# Patient Record
Sex: MALE | Race: WHITE | NOT HISPANIC OR LATINO | URBAN - METROPOLITAN AREA
[De-identification: names, ages, dates, MRNs, and addresses within clinical notes are randomized per-mention and may not be internally consistent; named-entity substitution may affect disease eponyms.]

---

## 2017-03-14 ENCOUNTER — APPOINTMENT (RX ONLY)
Dept: URBAN - METROPOLITAN AREA CLINIC 349 | Facility: CLINIC | Age: 57
Setting detail: DERMATOLOGY
End: 2017-03-14

## 2017-03-14 DIAGNOSIS — D18.0 HEMANGIOMA: ICD-10-CM

## 2017-03-14 DIAGNOSIS — Z85.828 PERSONAL HISTORY OF OTHER MALIGNANT NEOPLASM OF SKIN: ICD-10-CM

## 2017-03-14 DIAGNOSIS — L57.0 ACTINIC KERATOSIS: ICD-10-CM

## 2017-03-14 DIAGNOSIS — L57.8 OTHER SKIN CHANGES DUE TO CHRONIC EXPOSURE TO NONIONIZING RADIATION: ICD-10-CM

## 2017-03-14 DIAGNOSIS — D22 MELANOCYTIC NEVI: ICD-10-CM

## 2017-03-14 DIAGNOSIS — L71.8 OTHER ROSACEA: ICD-10-CM

## 2017-03-14 PROBLEM — D18.01 HEMANGIOMA OF SKIN AND SUBCUTANEOUS TISSUE: Status: ACTIVE | Noted: 2017-03-14

## 2017-03-14 PROBLEM — D22.62 MELANOCYTIC NEVI OF LEFT UPPER LIMB, INCLUDING SHOULDER: Status: ACTIVE | Noted: 2017-03-14

## 2017-03-14 PROCEDURE — 99214 OFFICE O/P EST MOD 30 MIN: CPT | Mod: 25

## 2017-03-14 PROCEDURE — ? COUNSELING

## 2017-03-14 PROCEDURE — ? LIQUID NITROGEN

## 2017-03-14 PROCEDURE — 17000 DESTRUCT PREMALG LESION: CPT

## 2017-03-14 PROCEDURE — ? PRESCRIPTION

## 2017-03-14 PROCEDURE — 17003 DESTRUCT PREMALG LES 2-14: CPT

## 2017-03-14 RX ORDER — AZELAIC ACID 0.15 G/G
GEL TOPICAL QD
Qty: 2 | Refills: 3 | Status: ERX

## 2017-03-14 ASSESSMENT — LOCATION DETAILED DESCRIPTION DERM
LOCATION DETAILED: LEFT SUPERIOR MEDIAL LOWER BACK
LOCATION DETAILED: RIGHT CENTRAL MALAR CHEEK
LOCATION DETAILED: STERNAL NOTCH
LOCATION DETAILED: LEFT ANTERIOR PROXIMAL UPPER ARM
LOCATION DETAILED: LEFT PROXIMAL DORSAL FOREARM
LOCATION DETAILED: RIGHT CHIN
LOCATION DETAILED: RIGHT INFERIOR MEDIAL FOREHEAD
LOCATION DETAILED: LEFT ANTERIOR DISTAL UPPER ARM
LOCATION DETAILED: LEFT CENTRAL MALAR CHEEK
LOCATION DETAILED: LEFT ULNAR DORSAL HAND
LOCATION DETAILED: RIGHT INFERIOR CENTRAL MALAR CHEEK
LOCATION DETAILED: LEFT SUPERIOR MEDIAL UPPER BACK
LOCATION DETAILED: RIGHT INFERIOR UPPER BACK
LOCATION DETAILED: RIGHT SUPERIOR PARIETAL SCALP
LOCATION DETAILED: RIGHT PROXIMAL DORSAL FOREARM
LOCATION DETAILED: STERNUM
LOCATION DETAILED: EPIGASTRIC SKIN
LOCATION DETAILED: RIGHT ANTERIOR DISTAL UPPER ARM
LOCATION DETAILED: NASAL DORSUM
LOCATION DETAILED: RIGHT LOWER CUTANEOUS LIP
LOCATION DETAILED: RIGHT RADIAL DORSAL HAND
LOCATION DETAILED: LEFT LATERAL EYEBROW
LOCATION DETAILED: RIGHT SUPERIOR MEDIAL MALAR CHEEK
LOCATION DETAILED: RIGHT MEDIAL FOREHEAD
LOCATION DETAILED: RIGHT ANTECUBITAL SKIN

## 2017-03-14 ASSESSMENT — LOCATION SIMPLE DESCRIPTION DERM
LOCATION SIMPLE: CHEST
LOCATION SIMPLE: LEFT EYEBROW
LOCATION SIMPLE: LEFT HAND
LOCATION SIMPLE: LEFT CHEEK
LOCATION SIMPLE: CHIN
LOCATION SIMPLE: RIGHT ELBOW
LOCATION SIMPLE: LEFT FOREARM
LOCATION SIMPLE: RIGHT UPPER BACK
LOCATION SIMPLE: RIGHT FOREHEAD
LOCATION SIMPLE: RIGHT FOREARM
LOCATION SIMPLE: SCALP
LOCATION SIMPLE: LEFT UPPER BACK
LOCATION SIMPLE: RIGHT UPPER ARM
LOCATION SIMPLE: RIGHT HAND
LOCATION SIMPLE: NOSE
LOCATION SIMPLE: ABDOMEN
LOCATION SIMPLE: LEFT LOWER BACK
LOCATION SIMPLE: RIGHT LIP
LOCATION SIMPLE: LEFT UPPER ARM
LOCATION SIMPLE: RIGHT CHEEK

## 2017-03-14 ASSESSMENT — LOCATION ZONE DERM
LOCATION ZONE: ARM
LOCATION ZONE: LIP
LOCATION ZONE: TRUNK
LOCATION ZONE: SCALP
LOCATION ZONE: FACE
LOCATION ZONE: NOSE
LOCATION ZONE: HAND

## 2017-03-14 ASSESSMENT — PAIN INTENSITY VAS: HOW INTENSE IS YOUR PAIN 0 BEING NO PAIN, 10 BEING THE MOST SEVERE PAIN POSSIBLE?: NO PAIN

## 2017-03-14 NOTE — PROCEDURE: LIQUID NITROGEN
Number Of Freeze-Thaw Cycles: 1 freeze-thaw cycle
Detail Level: Detailed
Duration Of Freeze Thaw-Cycle (Seconds): 3
Consent: The patient's consent was obtained including but not limited to risks of crusting, scabbing, blistering, scarring, darker or lighter pigmentary change, recurrence, incomplete removal and infection.
Render Post-Care Instructions In Note?: no
Post-Care Instructions: I reviewed with the patient in detail post-care instructions. Patient is to wear sunprotection, and avoid picking at any of the treated lesions. Pt may apply Vaseline to crusted or scabbing areas.

## 2017-09-14 ENCOUNTER — APPOINTMENT (RX ONLY)
Dept: URBAN - METROPOLITAN AREA CLINIC 349 | Facility: CLINIC | Age: 57
Setting detail: DERMATOLOGY
End: 2017-09-14

## 2017-09-14 DIAGNOSIS — L81.4 OTHER MELANIN HYPERPIGMENTATION: ICD-10-CM

## 2017-09-14 DIAGNOSIS — D18.0 HEMANGIOMA: ICD-10-CM

## 2017-09-14 DIAGNOSIS — L82.0 INFLAMED SEBORRHEIC KERATOSIS: ICD-10-CM

## 2017-09-14 DIAGNOSIS — L57.0 ACTINIC KERATOSIS: ICD-10-CM

## 2017-09-14 DIAGNOSIS — L81.2 FRECKLES: ICD-10-CM

## 2017-09-14 DIAGNOSIS — L71.8 OTHER ROSACEA: ICD-10-CM

## 2017-09-14 DIAGNOSIS — Z85.828 PERSONAL HISTORY OF OTHER MALIGNANT NEOPLASM OF SKIN: ICD-10-CM

## 2017-09-14 DIAGNOSIS — L57.8 OTHER SKIN CHANGES DUE TO CHRONIC EXPOSURE TO NONIONIZING RADIATION: ICD-10-CM

## 2017-09-14 DIAGNOSIS — D22 MELANOCYTIC NEVI: ICD-10-CM

## 2017-09-14 PROBLEM — K21.9 GASTRO-ESOPHAGEAL REFLUX DISEASE WITHOUT ESOPHAGITIS: Status: ACTIVE | Noted: 2017-09-14

## 2017-09-14 PROBLEM — D22.72 MELANOCYTIC NEVI OF LEFT LOWER LIMB, INCLUDING HIP: Status: ACTIVE | Noted: 2017-09-14

## 2017-09-14 PROBLEM — D22.71 MELANOCYTIC NEVI OF RIGHT LOWER LIMB, INCLUDING HIP: Status: ACTIVE | Noted: 2017-09-14

## 2017-09-14 PROBLEM — D22.61 MELANOCYTIC NEVI OF RIGHT UPPER LIMB, INCLUDING SHOULDER: Status: ACTIVE | Noted: 2017-09-14

## 2017-09-14 PROBLEM — L20.84 INTRINSIC (ALLERGIC) ECZEMA: Status: ACTIVE | Noted: 2017-09-14

## 2017-09-14 PROBLEM — D22.5 MELANOCYTIC NEVI OF TRUNK: Status: ACTIVE | Noted: 2017-09-14

## 2017-09-14 PROBLEM — D18.01 HEMANGIOMA OF SKIN AND SUBCUTANEOUS TISSUE: Status: ACTIVE | Noted: 2017-09-14

## 2017-09-14 PROCEDURE — ? TREATMENT REGIMEN

## 2017-09-14 PROCEDURE — ? COUNSELING

## 2017-09-14 PROCEDURE — ? OTHER

## 2017-09-14 PROCEDURE — 17000 DESTRUCT PREMALG LESION: CPT | Mod: 59

## 2017-09-14 PROCEDURE — ? LIQUID NITROGEN

## 2017-09-14 PROCEDURE — 99214 OFFICE O/P EST MOD 30 MIN: CPT | Mod: 25

## 2017-09-14 PROCEDURE — 17110 DESTRUCTION B9 LES UP TO 14: CPT

## 2017-09-14 ASSESSMENT — LOCATION SIMPLE DESCRIPTION DERM
LOCATION SIMPLE: RIGHT HAND
LOCATION SIMPLE: LEFT SCALP
LOCATION SIMPLE: RIGHT UPPER ARM
LOCATION SIMPLE: LEFT HAND
LOCATION SIMPLE: POSTERIOR SCALP
LOCATION SIMPLE: LEFT CHEEK
LOCATION SIMPLE: RIGHT SHOULDER
LOCATION SIMPLE: LEFT SHOULDER
LOCATION SIMPLE: CHEST
LOCATION SIMPLE: NOSE
LOCATION SIMPLE: RIGHT UPPER BACK
LOCATION SIMPLE: LEFT POSTERIOR THIGH
LOCATION SIMPLE: LEFT UPPER BACK
LOCATION SIMPLE: RIGHT PRETIBIAL REGION
LOCATION SIMPLE: LEFT LOWER BACK
LOCATION SIMPLE: RIGHT FOREARM
LOCATION SIMPLE: LEFT FOREARM
LOCATION SIMPLE: RIGHT CHEEK
LOCATION SIMPLE: RIGHT FOREHEAD
LOCATION SIMPLE: ABDOMEN

## 2017-09-14 ASSESSMENT — LOCATION ZONE DERM
LOCATION ZONE: FACE
LOCATION ZONE: HAND
LOCATION ZONE: TRUNK
LOCATION ZONE: NOSE
LOCATION ZONE: LEG
LOCATION ZONE: SCALP
LOCATION ZONE: ARM

## 2017-09-14 ASSESSMENT — LOCATION DETAILED DESCRIPTION DERM
LOCATION DETAILED: RIGHT LATERAL SUPERIOR CHEST
LOCATION DETAILED: LEFT RIB CAGE
LOCATION DETAILED: LEFT SUPERIOR MEDIAL MIDBACK
LOCATION DETAILED: RIGHT INFERIOR MEDIAL UPPER BACK
LOCATION DETAILED: RIGHT SUPERIOR MEDIAL UPPER BACK
LOCATION DETAILED: POSTERIOR MID-PARIETAL SCALP
LOCATION DETAILED: LEFT PROXIMAL DORSAL FOREARM
LOCATION DETAILED: RIGHT DISTAL DORSAL FOREARM
LOCATION DETAILED: RIGHT SUPERIOR FOREHEAD
LOCATION DETAILED: RIGHT ANTERIOR PROXIMAL UPPER ARM
LOCATION DETAILED: LEFT MEDIAL SUPERIOR CHEST
LOCATION DETAILED: LEFT POSTERIOR SHOULDER
LOCATION DETAILED: RIGHT CENTRAL MALAR CHEEK
LOCATION DETAILED: RIGHT POSTERIOR SHOULDER
LOCATION DETAILED: RIGHT PROXIMAL PRETIBIAL REGION
LOCATION DETAILED: EPIGASTRIC SKIN
LOCATION DETAILED: LEFT CENTRAL FRONTAL SCALP
LOCATION DETAILED: LEFT MID-UPPER BACK
LOCATION DETAILED: RIGHT SUPERIOR UPPER BACK
LOCATION DETAILED: RIGHT RADIAL DORSAL HAND
LOCATION DETAILED: PERIUMBILICAL SKIN
LOCATION DETAILED: RIGHT PROXIMAL DORSAL FOREARM
LOCATION DETAILED: LEFT DISTAL DORSAL FOREARM
LOCATION DETAILED: NASAL DORSUM
LOCATION DETAILED: LEFT DORSAL RING METACARPOPHALANGEAL JOINT
LOCATION DETAILED: LEFT CENTRAL MALAR CHEEK
LOCATION DETAILED: LEFT DISTAL POSTERIOR THIGH

## 2017-09-14 ASSESSMENT — PAIN INTENSITY VAS: HOW INTENSE IS YOUR PAIN 0 BEING NO PAIN, 10 BEING THE MOST SEVERE PAIN POSSIBLE?: NO PAIN

## 2017-09-14 NOTE — PROCEDURE: OTHER
Note Text (......Xxx Chief Complaint.): Photographs taken with pt permission
Other (Free Text): Pt will call if he likes the Soolantra and we will send a prescription
Detail Level: Detailed

## 2017-09-14 NOTE — PROCEDURE: LIQUID NITROGEN
Medical Necessity Clause: This procedure was medically necessary because the lesions that were treated were: irritated by clothing
Consent: The patient's consent was obtained including but not limited to risks of crusting, scabbing, blistering, scarring, darker or lighter pigmentary change, recurrence, incomplete removal and infection.
Duration Of Freeze Thaw-Cycle (Seconds): 2
Number Of Freeze-Thaw Cycles: 2 freeze-thaw cycles
Post-Care Instructions: I reviewed with the patient in detail post-care instructions. Patient is to wear sunprotection, and avoid picking at any of the treated lesions. Pt may apply Vaseline to crusted or scabbing areas.
Detail Level: Detailed
Render Post-Care Instructions In Note?: no
Include Z78.9 (Other Specified Conditions Influencing Health Status) As An Associated Diagnosis?: Yes
Medical Necessity Information: It is in your best interest to select a reason for this procedure from the list below. All of these items fulfill various CMS LCD requirements except the new and changing color options.
Number Of Freeze-Thaw Cycles: 1 freeze-thaw cycle

## 2017-09-14 NOTE — PROCEDURE: TREATMENT REGIMEN
Initiate Treatment: Soolantra to use pea size on face at night
Samples Given: Soolantra
Detail Level: Zone

## 2018-01-23 ENCOUNTER — APPOINTMENT (RX ONLY)
Dept: URBAN - METROPOLITAN AREA CLINIC 349 | Facility: CLINIC | Age: 58
Setting detail: DERMATOLOGY
End: 2018-01-23

## 2018-01-23 DIAGNOSIS — L57.0 ACTINIC KERATOSIS: ICD-10-CM

## 2018-01-23 DIAGNOSIS — Z85.828 PERSONAL HISTORY OF OTHER MALIGNANT NEOPLASM OF SKIN: ICD-10-CM

## 2018-01-23 PROCEDURE — ? COUNSELING

## 2018-01-23 PROCEDURE — ? LIQUID NITROGEN

## 2018-01-23 PROCEDURE — 17003 DESTRUCT PREMALG LES 2-14: CPT

## 2018-01-23 PROCEDURE — 17000 DESTRUCT PREMALG LESION: CPT

## 2018-01-23 PROCEDURE — 99213 OFFICE O/P EST LOW 20 MIN: CPT | Mod: 25

## 2018-01-23 ASSESSMENT — LOCATION SIMPLE DESCRIPTION DERM
LOCATION SIMPLE: SCALP
LOCATION SIMPLE: RIGHT EAR

## 2018-01-23 ASSESSMENT — LOCATION DETAILED DESCRIPTION DERM
LOCATION DETAILED: RIGHT CENTRAL PARIETAL SCALP
LOCATION DETAILED: RIGHT ANTERIOR EARLOBE
LOCATION DETAILED: LEFT SUPERIOR PARIETAL SCALP
LOCATION DETAILED: LEFT CENTRAL PARIETAL SCALP

## 2018-01-23 ASSESSMENT — LOCATION ZONE DERM
LOCATION ZONE: EAR
LOCATION ZONE: SCALP

## 2018-01-23 NOTE — HPI: SKIN LESIONS
Have Your Skin Lesions Been Treated?: not been treated
Is This A New Presentation, Or A Follow-Up?: Skin Lesions
Which Family Member (Optional)?: Brother

## 2018-06-26 ENCOUNTER — APPOINTMENT (RX ONLY)
Dept: URBAN - METROPOLITAN AREA CLINIC 349 | Facility: CLINIC | Age: 58
Setting detail: DERMATOLOGY
End: 2018-06-26

## 2018-06-26 DIAGNOSIS — L82.0 INFLAMED SEBORRHEIC KERATOSIS: ICD-10-CM

## 2018-06-26 DIAGNOSIS — L81.4 OTHER MELANIN HYPERPIGMENTATION: ICD-10-CM

## 2018-06-26 DIAGNOSIS — L82.1 OTHER SEBORRHEIC KERATOSIS: ICD-10-CM

## 2018-06-26 DIAGNOSIS — L57.0 ACTINIC KERATOSIS: ICD-10-CM

## 2018-06-26 DIAGNOSIS — Z85.828 PERSONAL HISTORY OF OTHER MALIGNANT NEOPLASM OF SKIN: ICD-10-CM

## 2018-06-26 DIAGNOSIS — D18.0 HEMANGIOMA: ICD-10-CM

## 2018-06-26 DIAGNOSIS — D22 MELANOCYTIC NEVI: ICD-10-CM

## 2018-06-26 PROBLEM — D22.5 MELANOCYTIC NEVI OF TRUNK: Status: ACTIVE | Noted: 2018-06-26

## 2018-06-26 PROBLEM — D18.01 HEMANGIOMA OF SKIN AND SUBCUTANEOUS TISSUE: Status: ACTIVE | Noted: 2018-06-26

## 2018-06-26 PROBLEM — D22.61 MELANOCYTIC NEVI OF RIGHT UPPER LIMB, INCLUDING SHOULDER: Status: ACTIVE | Noted: 2018-06-26

## 2018-06-26 PROCEDURE — ? LIQUID NITROGEN

## 2018-06-26 PROCEDURE — 99213 OFFICE O/P EST LOW 20 MIN: CPT | Mod: 25

## 2018-06-26 PROCEDURE — 17110 DESTRUCTION B9 LES UP TO 14: CPT

## 2018-06-26 PROCEDURE — ? COUNSELING

## 2018-06-26 ASSESSMENT — LOCATION SIMPLE DESCRIPTION DERM
LOCATION SIMPLE: LEFT UPPER ARM
LOCATION SIMPLE: LEFT LOWER BACK
LOCATION SIMPLE: LEFT PRETIBIAL REGION
LOCATION SIMPLE: RIGHT SHOULDER
LOCATION SIMPLE: ABDOMEN
LOCATION SIMPLE: LEFT UPPER BACK
LOCATION SIMPLE: LEFT FOREHEAD
LOCATION SIMPLE: RIGHT PRETIBIAL REGION
LOCATION SIMPLE: RIGHT LOWER BACK
LOCATION SIMPLE: SCALP
LOCATION SIMPLE: CHEST
LOCATION SIMPLE: RIGHT FOREARM

## 2018-06-26 ASSESSMENT — LOCATION DETAILED DESCRIPTION DERM
LOCATION DETAILED: RIGHT VENTRAL DISTAL FOREARM
LOCATION DETAILED: RIGHT CENTRAL PARIETAL SCALP
LOCATION DETAILED: LEFT MEDIAL INFERIOR CHEST
LOCATION DETAILED: RIGHT ANTERIOR SHOULDER
LOCATION DETAILED: RIGHT PROXIMAL PRETIBIAL REGION
LOCATION DETAILED: PERIUMBILICAL SKIN
LOCATION DETAILED: LEFT LATERAL FOREHEAD
LOCATION DETAILED: LEFT SUPERIOR LATERAL UPPER BACK
LOCATION DETAILED: RIGHT INFERIOR MEDIAL MIDBACK
LOCATION DETAILED: LEFT PROXIMAL PRETIBIAL REGION
LOCATION DETAILED: LEFT INFERIOR MEDIAL MIDBACK
LOCATION DETAILED: LEFT DISTAL PRETIBIAL REGION
LOCATION DETAILED: LEFT SUPERIOR PARIETAL SCALP
LOCATION DETAILED: RIGHT POSTERIOR SHOULDER
LOCATION DETAILED: LEFT ANTERIOR DISTAL UPPER ARM

## 2018-06-26 ASSESSMENT — LOCATION ZONE DERM
LOCATION ZONE: SCALP
LOCATION ZONE: TRUNK
LOCATION ZONE: LEG
LOCATION ZONE: FACE
LOCATION ZONE: ARM

## 2018-06-26 NOTE — PROCEDURE: LIQUID NITROGEN
Post-Care Instructions: I reviewed with the patient in detail post-care instructions. Patient is to wear sunprotection, and avoid picking at any of the treated lesions. Pt may apply Vaseline to crusted or scabbing areas.
Add 52 Modifier (Optional): no
Medical Necessity Information: It is in your best interest to select a reason for this procedure from the list below. All of these items fulfill various CMS LCD requirements except the new and changing color options.
Include Z78.9 (Other Specified Conditions Influencing Health Status) As An Associated Diagnosis?: Yes
Detail Level: Detailed
Medical Necessity Clause: This procedure was medically necessary because the lesions that were treated were:
Consent: The patient's consent was obtained including but not limited to risks of crusting, scabbing, blistering, scarring, darker or lighter pigmentary change, recurrence, incomplete removal and infection.

## 2018-12-07 NOTE — HPI: SKIN LESIONS
How Severe Is Your Skin Lesion?: mild
Have Your Skin Lesions Been Treated?: not been treated
Is This A New Presentation, Or A Follow-Up?: Skin Lesions
normal...

## 2019-04-11 ENCOUNTER — APPOINTMENT (RX ONLY)
Dept: URBAN - METROPOLITAN AREA CLINIC 349 | Facility: CLINIC | Age: 59
Setting detail: DERMATOLOGY
End: 2019-04-11

## 2019-04-11 DIAGNOSIS — L90.8 OTHER ATROPHIC DISORDERS OF SKIN: ICD-10-CM

## 2019-04-11 DIAGNOSIS — L57.0 ACTINIC KERATOSIS: ICD-10-CM

## 2019-04-11 DIAGNOSIS — L82.0 INFLAMED SEBORRHEIC KERATOSIS: ICD-10-CM

## 2019-04-11 DIAGNOSIS — D18.0 HEMANGIOMA: ICD-10-CM

## 2019-04-11 PROBLEM — D18.01 HEMANGIOMA OF SKIN AND SUBCUTANEOUS TISSUE: Status: ACTIVE | Noted: 2019-04-11

## 2019-04-11 PROBLEM — L20.84 INTRINSIC (ALLERGIC) ECZEMA: Status: ACTIVE | Noted: 2019-04-11

## 2019-04-11 PROCEDURE — ? RECOMMENDATIONS

## 2019-04-11 PROCEDURE — ? LIQUID NITROGEN

## 2019-04-11 PROCEDURE — 99213 OFFICE O/P EST LOW 20 MIN: CPT | Mod: 25

## 2019-04-11 PROCEDURE — ? COUNSELING

## 2019-04-11 PROCEDURE — 17110 DESTRUCTION B9 LES UP TO 14: CPT

## 2019-04-11 PROCEDURE — ? PRESCRIPTION

## 2019-04-11 PROCEDURE — 17000 DESTRUCT PREMALG LESION: CPT | Mod: 59

## 2019-04-11 PROCEDURE — 17003 DESTRUCT PREMALG LES 2-14: CPT | Mod: 59

## 2019-04-11 RX ORDER — TRETINOIN 0.5 MG/G
CREAM TOPICAL
Qty: 1 | Refills: 6 | Status: ERX | COMMUNITY
Start: 2019-04-11

## 2019-04-11 RX ADMIN — TRETINOIN: 0.5 CREAM TOPICAL at 17:33

## 2019-04-11 ASSESSMENT — LOCATION DETAILED DESCRIPTION DERM
LOCATION DETAILED: RIGHT SUPERIOR MEDIAL FOREHEAD
LOCATION DETAILED: LEFT INFERIOR LATERAL MIDBACK
LOCATION DETAILED: LEFT PROXIMAL POSTERIOR UPPER ARM
LOCATION DETAILED: INFERIOR THORACIC SPINE
LOCATION DETAILED: LEFT SUPERIOR OCCIPITAL SCALP
LOCATION DETAILED: RIGHT LOWER CUTANEOUS LIP
LOCATION DETAILED: LEFT MEDIAL INFERIOR CHEST
LOCATION DETAILED: LEFT MEDIAL FOREHEAD
LOCATION DETAILED: RIGHT CENTRAL MALAR CHEEK
LOCATION DETAILED: LEFT CENTRAL MALAR CHEEK
LOCATION DETAILED: LEFT SUPERIOR FOREHEAD
LOCATION DETAILED: MID-FRONTAL SCALP

## 2019-04-11 ASSESSMENT — LOCATION SIMPLE DESCRIPTION DERM
LOCATION SIMPLE: RIGHT LIP
LOCATION SIMPLE: LEFT LOWER BACK
LOCATION SIMPLE: LEFT CHEEK
LOCATION SIMPLE: LEFT OCCIPITAL SCALP
LOCATION SIMPLE: RIGHT FOREHEAD
LOCATION SIMPLE: ANTERIOR SCALP
LOCATION SIMPLE: CHEST
LOCATION SIMPLE: UPPER BACK
LOCATION SIMPLE: LEFT UPPER ARM
LOCATION SIMPLE: LEFT FOREHEAD
LOCATION SIMPLE: RIGHT CHEEK

## 2019-04-11 ASSESSMENT — LOCATION ZONE DERM
LOCATION ZONE: FACE
LOCATION ZONE: ARM
LOCATION ZONE: TRUNK
LOCATION ZONE: SCALP
LOCATION ZONE: LIP

## 2019-04-11 ASSESSMENT — PAIN INTENSITY VAS: HOW INTENSE IS YOUR PAIN 0 BEING NO PAIN, 10 BEING THE MOST SEVERE PAIN POSSIBLE?: NO PAIN

## 2019-04-11 NOTE — PROCEDURE: LIQUID NITROGEN
Consent: The patient's consent was obtained including but not limited to risks of crusting, scabbing, blistering, scarring, darker or lighter pigmentary change, recurrence, incomplete removal and infection.
Medical Necessity Clause: This procedure was medically necessary because the lesions that were treated were:
Post-Care Instructions: I reviewed with the patient in detail post-care instructions. Patient is to wear sunprotection, and avoid picking at any of the treated lesions. Pt may apply Vaseline to crusted or scabbing areas.
Number Of Freeze-Thaw Cycles: 1 freeze-thaw cycle
Render Post-Care Instructions In Note?: no
Detail Level: Detailed
Duration Of Freeze Thaw-Cycle (Seconds): 2
Detail Level: Zone
Duration Of Freeze Thaw-Cycle (Seconds): 3
Include Z78.9 (Other Specified Conditions Influencing Health Status) As An Associated Diagnosis?: Yes
Medical Necessity Information: It is in your best interest to select a reason for this procedure from the list below. All of these items fulfill various CMS LCD requirements except the new and changing color options.

## 2019-07-22 PROBLEM — R93.1 ELEVATED CORONARY ARTERY CALCIUM SCORE: Status: ACTIVE | Noted: 2019-07-22

## 2019-09-13 NOTE — HPI: SKIN LESIONS
How Severe Is Your Skin Lesion?: moderate
Have Your Skin Lesions Been Treated?: not been treated
Is This A New Presentation, Or A Follow-Up?: Skin Lesions
I personally performed the service described in the documentation recorded by the scribe in my presence, and it accurately and completely records my words and actions.

## 2019-10-31 ENCOUNTER — APPOINTMENT (RX ONLY)
Dept: URBAN - METROPOLITAN AREA CLINIC 349 | Facility: CLINIC | Age: 59
Setting detail: DERMATOLOGY
End: 2019-10-31

## 2019-10-31 DIAGNOSIS — D22 MELANOCYTIC NEVI: ICD-10-CM

## 2019-10-31 DIAGNOSIS — L57.0 ACTINIC KERATOSIS: ICD-10-CM

## 2019-10-31 DIAGNOSIS — Z85.828 PERSONAL HISTORY OF OTHER MALIGNANT NEOPLASM OF SKIN: ICD-10-CM

## 2019-10-31 PROBLEM — D22.9 MELANOCYTIC NEVI, UNSPECIFIED: Status: ACTIVE | Noted: 2019-10-31

## 2019-10-31 PROCEDURE — 17000 DESTRUCT PREMALG LESION: CPT

## 2019-10-31 PROCEDURE — ? LIQUID NITROGEN

## 2019-10-31 PROCEDURE — ? COUNSELING

## 2019-10-31 PROCEDURE — 17003 DESTRUCT PREMALG LES 2-14: CPT

## 2019-10-31 PROCEDURE — 99213 OFFICE O/P EST LOW 20 MIN: CPT | Mod: 25

## 2019-10-31 ASSESSMENT — LOCATION DETAILED DESCRIPTION DERM
LOCATION DETAILED: RIGHT ANTERIOR EARLOBE
LOCATION DETAILED: LEFT CENTRAL ZYGOMA
LOCATION DETAILED: LEFT CENTRAL MALAR CHEEK
LOCATION DETAILED: LEFT INFERIOR LATERAL MALAR CHEEK
LOCATION DETAILED: LEFT SUPERIOR PARIETAL SCALP
LOCATION DETAILED: SUPERIOR MID FOREHEAD

## 2019-10-31 ASSESSMENT — LOCATION SIMPLE DESCRIPTION DERM
LOCATION SIMPLE: LEFT ZYGOMA
LOCATION SIMPLE: LEFT CHEEK
LOCATION SIMPLE: RIGHT EAR
LOCATION SIMPLE: SCALP
LOCATION SIMPLE: SUPERIOR FOREHEAD

## 2019-10-31 ASSESSMENT — LOCATION ZONE DERM
LOCATION ZONE: EAR
LOCATION ZONE: SCALP
LOCATION ZONE: FACE

## 2020-06-11 ENCOUNTER — APPOINTMENT (RX ONLY)
Dept: URBAN - METROPOLITAN AREA CLINIC 349 | Facility: CLINIC | Age: 60
Setting detail: DERMATOLOGY
End: 2020-06-11

## 2020-06-11 DIAGNOSIS — L82.1 OTHER SEBORRHEIC KERATOSIS: ICD-10-CM

## 2020-06-11 DIAGNOSIS — L72.0 EPIDERMAL CYST: ICD-10-CM

## 2020-06-11 DIAGNOSIS — L57.0 ACTINIC KERATOSIS: ICD-10-CM

## 2020-06-11 DIAGNOSIS — Z71.89 OTHER SPECIFIED COUNSELING: ICD-10-CM

## 2020-06-11 DIAGNOSIS — L73.8 OTHER SPECIFIED FOLLICULAR DISORDERS: ICD-10-CM

## 2020-06-11 DIAGNOSIS — Z85.828 PERSONAL HISTORY OF OTHER MALIGNANT NEOPLASM OF SKIN: ICD-10-CM

## 2020-06-11 PROCEDURE — ? BENIGN DESTRUCTION

## 2020-06-11 PROCEDURE — ? COUNSELING

## 2020-06-11 PROCEDURE — ? EDUCATIONAL RESOURCES PROVIDED

## 2020-06-11 PROCEDURE — ? ACNE SURGERY

## 2020-06-11 PROCEDURE — ? LIQUID NITROGEN

## 2020-06-11 ASSESSMENT — LOCATION ZONE DERM
LOCATION ZONE: NOSE
LOCATION ZONE: TRUNK
LOCATION ZONE: SCALP
LOCATION ZONE: FACE

## 2020-06-11 ASSESSMENT — LOCATION SIMPLE DESCRIPTION DERM
LOCATION SIMPLE: UPPER BACK
LOCATION SIMPLE: LEFT FOREHEAD
LOCATION SIMPLE: RIGHT TEMPLE
LOCATION SIMPLE: RIGHT CHEEK
LOCATION SIMPLE: LEFT NOSE
LOCATION SIMPLE: RIGHT ZYGOMA
LOCATION SIMPLE: LEFT CHEEK
LOCATION SIMPLE: LEFT SCALP
LOCATION SIMPLE: RIGHT UPPER BACK
LOCATION SIMPLE: RIGHT FOREHEAD
LOCATION SIMPLE: ANTERIOR SCALP
LOCATION SIMPLE: SCALP

## 2020-06-11 ASSESSMENT — LOCATION DETAILED DESCRIPTION DERM
LOCATION DETAILED: LEFT NASAL ALA
LOCATION DETAILED: RIGHT SUPERIOR MEDIAL FOREHEAD
LOCATION DETAILED: LEFT CENTRAL FRONTAL SCALP
LOCATION DETAILED: RIGHT INFERIOR LATERAL FOREHEAD
LOCATION DETAILED: LEFT CENTRAL MALAR CHEEK
LOCATION DETAILED: LEFT SUPERIOR FOREHEAD
LOCATION DETAILED: INFERIOR THORACIC SPINE
LOCATION DETAILED: RIGHT SUPERIOR MEDIAL UPPER BACK
LOCATION DETAILED: RIGHT LATERAL FOREHEAD
LOCATION DETAILED: RIGHT SUPERIOR FOREHEAD
LOCATION DETAILED: MID-FRONTAL SCALP
LOCATION DETAILED: RIGHT MEDIAL TEMPLE
LOCATION DETAILED: RIGHT SUPERIOR MEDIAL MALAR CHEEK
LOCATION DETAILED: RIGHT CENTRAL TEMPLE
LOCATION DETAILED: RIGHT CENTRAL MALAR CHEEK
LOCATION DETAILED: LEFT CENTRAL PARIETAL SCALP
LOCATION DETAILED: LEFT MEDIAL FOREHEAD
LOCATION DETAILED: RIGHT CENTRAL ZYGOMA
LOCATION DETAILED: RIGHT INFERIOR FOREHEAD
LOCATION DETAILED: LEFT INFERIOR FOREHEAD

## 2020-06-11 NOTE — PROCEDURE: ACNE SURGERY
Extraction Method: electrocautery
Render Number Of Lesions Treated: yes
Render Post-Care Instructions In Note?: no
Acne Type: Cysts
Prep Text (Optional): Prior to removal the treatment areas were prepped in the usual fashion.
Consent was obtained and risks were reviewed including but not limited to scarring, infection, bleeding, scabbing, incomplete removal, and allergy to anesthesia.
Detail Level: Detailed
Post-Care Instructions: I reviewed with the patient in detail post-care instructions. Patient is to keep the treatment areaas dry overnight, and then apply bacitracin twice daily until healed. Patient may apply hydrogen peroxide soaks to remove any crusting.

## 2020-06-11 NOTE — PROCEDURE: BENIGN DESTRUCTION

## 2020-06-11 NOTE — PROCEDURE: LIQUID NITROGEN
Detail Level: Detailed
Duration Of Freeze Thaw-Cycle (Seconds): 3
Render Note In Bullet Format When Appropriate: No
Post-Care Instructions: I reviewed with the patient in detail post-care instructions. Patient is to wear sunprotection, and avoid picking at any of the treated lesions. Pt may apply Vaseline to crusted or scabbing areas.
Number Of Freeze-Thaw Cycles: 2 freeze-thaw cycles
Consent: The patient's consent was obtained including but not limited to risks of crusting, scabbing, blistering, scarring, darker or lighter pigmentary change, recurrence, incomplete removal and infection.

## 2020-07-17 ENCOUNTER — APPOINTMENT (RX ONLY)
Dept: URBAN - METROPOLITAN AREA CLINIC 349 | Facility: CLINIC | Age: 60
Setting detail: DERMATOLOGY
End: 2020-07-17

## 2020-07-17 ENCOUNTER — RX ONLY (OUTPATIENT)
Age: 60
Setting detail: RX ONLY
End: 2020-07-17

## 2020-07-17 DIAGNOSIS — D22 MELANOCYTIC NEVI: ICD-10-CM

## 2020-07-17 PROBLEM — D48.5 NEOPLASM OF UNCERTAIN BEHAVIOR OF SKIN: Status: ACTIVE | Noted: 2020-07-17

## 2020-07-17 PROBLEM — D22.4 MELANOCYTIC NEVI OF SCALP AND NECK: Status: ACTIVE | Noted: 2020-07-17

## 2020-07-17 PROBLEM — D04.39 CARCINOMA IN SITU OF SKIN OF OTHER PARTS OF FACE: Status: ACTIVE | Noted: 2020-07-17

## 2020-07-17 PROCEDURE — ? SHAVE REMOVAL AND DESTRUCTION

## 2020-07-17 PROCEDURE — 17281 DSTR MAL LS F/E/E/N/L/M .6-1: CPT

## 2020-07-17 PROCEDURE — 88305 TISSUE EXAM BY PATHOLOGIST: CPT

## 2020-07-17 PROCEDURE — ? PATHOLOGY BILLING

## 2020-07-17 PROCEDURE — A4550 SURGICAL TRAYS: HCPCS

## 2020-07-17 PROCEDURE — 99213 OFFICE O/P EST LOW 20 MIN: CPT | Mod: 25

## 2020-07-17 PROCEDURE — ? COUNSELING

## 2020-07-17 RX ORDER — AZELAIC ACID 0.15 G/G
GEL TOPICAL QD
Qty: 2 | Refills: 11 | Status: ERX | COMMUNITY
Start: 2020-07-17

## 2020-07-17 ASSESSMENT — LOCATION ZONE DERM: LOCATION ZONE: SCALP

## 2020-07-17 ASSESSMENT — LOCATION SIMPLE DESCRIPTION DERM
LOCATION SIMPLE: LEFT SCALP
LOCATION SIMPLE: SCALP

## 2020-07-17 ASSESSMENT — LOCATION DETAILED DESCRIPTION DERM
LOCATION DETAILED: LEFT MEDIAL FRONTAL SCALP
LOCATION DETAILED: RIGHT SUPERIOR PARIETAL SCALP

## 2020-07-17 NOTE — PROCEDURE: PATHOLOGY BILLING
Immunohistochemistry ([id number] and [id number]) billing is not performed here. Please use the Immunohistochemistry Stain Billing plan to accomplish this. Immunohistochemistry ([id number] and [id number]) billing is not performed here. Please use the Immunohistochemistry Stain Billing plan to accomplish this.

## 2020-07-17 NOTE — PROCEDURE: SHAVE REMOVAL AND DESTRUCTION
Dressing: Band-Aid
Render Post-Care Instructions In Note?: yes
Size After Destruction (Required For Destruction Billing): 0.7
Anesthesia Type: 2% lidocaine with epinephrine
Bill As?: Note: Bill Malignant Destruction If Path Confirms Malignant Lesion. Only Bill As Shave Removal If Path Comes Back Benign. Do Not Bill Shave Removal On Malignant Lesions.: Malignant Destruction
Billing Type: Third-Party Bill
Wound Care: Vaseline
Post-Care Instructions: I reviewed with the patient in detail post-care instructions. Patient is to keep the biopsy site dry overnight, and then apply bacitracin twice daily until healed. Patient may apply hydrogen peroxide soaks to remove any crusting.  After the procedure, the patient was observed for 5-10 minutes and was oriented to,person, place and time and denied feeling dizzy, queasy and stated that they were not going to faint
Render Path Notes In Note?: No
Consent: Written consent was obtained and risks were reviewed including but not limited to scarring, infection, bleeding, scabbing, incomplete removal, nerve damage and allergy to anesthesia.
Size Of Lesion In Cm: 0
Cautery Type: electrodesiccation
Accession #: [id number] [email or web address] [id number]
Anesthesia Volume In Cc: 0.2
Number Of Curettages: 2
Notification Instructions: Patient will be notified of biopsy results. However, patient instructed to call the office if not contacted within 2 weeks.
Detail Level: Detailed
Hemostasis: Electrocautery

## 2021-09-02 ENCOUNTER — APPOINTMENT (RX ONLY)
Dept: URBAN - METROPOLITAN AREA CLINIC 349 | Facility: CLINIC | Age: 61
Setting detail: DERMATOLOGY
End: 2021-09-02

## 2021-09-02 DIAGNOSIS — Z12.83 ENCOUNTER FOR SCREENING FOR MALIGNANT NEOPLASM OF SKIN: ICD-10-CM

## 2021-09-02 DIAGNOSIS — L57.0 ACTINIC KERATOSIS: ICD-10-CM

## 2021-09-02 DIAGNOSIS — L81.4 OTHER MELANIN HYPERPIGMENTATION: ICD-10-CM

## 2021-09-02 DIAGNOSIS — D22 MELANOCYTIC NEVI: ICD-10-CM

## 2021-09-02 DIAGNOSIS — D18.0 HEMANGIOMA: ICD-10-CM

## 2021-09-02 PROBLEM — Z85.828 PERSONAL HISTORY OF OTHER MALIGNANT NEOPLASM OF SKIN: Status: ACTIVE | Noted: 2021-09-02

## 2021-09-02 PROBLEM — D18.01 HEMANGIOMA OF SKIN AND SUBCUTANEOUS TISSUE: Status: ACTIVE | Noted: 2021-09-02

## 2021-09-02 PROBLEM — D22.4 MELANOCYTIC NEVI OF SCALP AND NECK: Status: ACTIVE | Noted: 2021-09-02

## 2021-09-02 PROCEDURE — 17003 DESTRUCT PREMALG LES 2-14: CPT

## 2021-09-02 PROCEDURE — ? COUNSELING

## 2021-09-02 PROCEDURE — ? LIQUID NITROGEN

## 2021-09-02 PROCEDURE — 99213 OFFICE O/P EST LOW 20 MIN: CPT | Mod: 25

## 2021-09-02 PROCEDURE — 17000 DESTRUCT PREMALG LESION: CPT

## 2021-09-02 ASSESSMENT — LOCATION DETAILED DESCRIPTION DERM
LOCATION DETAILED: LEFT SUPERIOR PARIETAL SCALP
LOCATION DETAILED: RIGHT SUPERIOR PARIETAL SCALP
LOCATION DETAILED: LEFT INFERIOR UPPER BACK
LOCATION DETAILED: LEFT CLAVICULAR SKIN
LOCATION DETAILED: LEFT MEDIAL FRONTAL SCALP
LOCATION DETAILED: LEFT MID-UPPER BACK
LOCATION DETAILED: LEFT CENTRAL PARIETAL SCALP
LOCATION DETAILED: RIGHT CENTRAL FRONTAL SCALP

## 2021-09-02 ASSESSMENT — LOCATION SIMPLE DESCRIPTION DERM
LOCATION SIMPLE: LEFT SCALP
LOCATION SIMPLE: LEFT CLAVICULAR SKIN
LOCATION SIMPLE: LEFT UPPER BACK
LOCATION SIMPLE: SCALP
LOCATION SIMPLE: RIGHT SCALP

## 2021-09-02 ASSESSMENT — LOCATION ZONE DERM
LOCATION ZONE: TRUNK
LOCATION ZONE: SCALP

## 2021-09-02 NOTE — PROCEDURE: LIQUID NITROGEN
Duration Of Freeze Thaw-Cycle (Seconds): 3
Render Post-Care Instructions In Note?: no
Show Applicator Variable?: Yes
Number Of Freeze-Thaw Cycles: 2 freeze-thaw cycles
Consent: The patient's consent was obtained including but not limited to risks of crusting, scabbing, blistering, scarring, darker or lighter pigmentary change, recurrence, incomplete removal and infection.
Post-Care Instructions: I reviewed with the patient in detail post-care instructions. Patient is to wear sunprotection, and avoid picking at any of the treated lesions. Pt may apply Vaseline to crusted or scabbing areas.
Detail Level: Detailed

## 2021-09-08 ENCOUNTER — RX ONLY (OUTPATIENT)
Age: 61
Setting detail: RX ONLY
End: 2021-09-08

## 2021-09-08 RX ORDER — FLUOROURACIL 10 MG/G
CREAM TOPICAL
Qty: 1 | Refills: 0 | Status: ERX | COMMUNITY
Start: 2021-09-08

## 2021-11-03 ENCOUNTER — HOSPITAL ENCOUNTER (OUTPATIENT)
Dept: PHYSICAL THERAPY | Age: 61
Discharge: HOME | End: 2021-11-03
Payer: COMMERCIAL

## 2021-11-03 PROCEDURE — 97161 PT EVAL LOW COMPLEX 20 MIN: CPT

## 2021-11-03 PROCEDURE — 97110 THERAPEUTIC EXERCISES: CPT

## 2021-11-03 NOTE — PROGRESS NOTES
Hayder Wade  : 1960  Primary: Alee Ligia Of Janes Montelongo*  Secondary:  [facility] at [address]  [phone]   [phone]        OUTPATIENT PHYSICAL THERAPY: Daily Treatment Note 11/3/2021    ICD-10: Treatment Diagnosis: Low Back Pain M54.5, Muscle Weakness Generalized M62.81  Precautions/Allergies:   Hydrocodone  TREATMENT PLAN:  Effective Dates: 11/3/2021 TO 12/3/2021 (30 days). Frequency/Duration: 2 times a week for 30 Day(s)    Pre-treatment Symptoms/Complaints:  Low Back pain   Pain: Initial: Pain Intensity 1: 6 /10 Post Session:  6/10   Medications Last Reviewed:  11/3/2021  Updated Objective Findings:  See evaluation note from today  TREATMENT:   Therapeutic Exercise: (15 Minutes):  Exercises per grid below to improve mobility, strength and coordination. Required minimal visual, verbal, manual and tactile cues to promote proper body alignment, promote proper body posture and promote proper body mechanics. Progressed resistance, range, repetitions and complexity of movement as indicated. Date:  11/3/2021   Activity/Exercise Parameters   Cheryle Gemraine pose 2 min   Cat/Camel 2 min   Foam Roll x2 2 min   Posterior pelvic tilt with march x20   Bridge with Band x20             Manual Therapy (     ): Manual techniques to facilitate improved motion and decreased pain. (Used abbreviations: MET - muscle energy technique; PNF - proprioceptive neuromuscular facilitation; NMR - neuromuscular re-education; a/p - anterior to posterior; p/a - posterior to anterior)   · NONE      [facility] Portal  Treatment/Session Summary:    · Response to Treatment:  Pt presents with chronic low back pain and limited by thoracic mobility, core and glute weakness. Pt responded well to treatment and had good understanding of HEP.    · Communication/Consultation:  None today  · Equipment provided today:  HEP  · Recommendations/Intent for next treatment session: Next visit will focus on strengthening, range of motion, and core stability.      Total Treatment Billable Duration:  30 minutes evaluation, 15 of treatment  PT Patient Time In/Time Out  Time In: 1130  Time Out: 1230  FRANCIS Ramos    Future Appointments   Date Time Provider Dona Wood   11/4/2021  3:00 PM Adams Barley [facility]   11/10/2021  3:00 PM Adams Barley [facility]   11/11/2021  3:00 PM Adams Barley [facility]   11/15/2021  8:30 AM Adams Barley [facility]   11/17/2021  8:30 AM Adams Barley [facility]
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
## 2021-11-04 ENCOUNTER — HOSPITAL ENCOUNTER (OUTPATIENT)
Dept: PHYSICAL THERAPY | Age: 61
Discharge: HOME | End: 2021-11-04
Payer: COMMERCIAL

## 2021-11-04 PROCEDURE — 97110 THERAPEUTIC EXERCISES: CPT

## 2021-11-04 PROCEDURE — 97140 MANUAL THERAPY 1/> REGIONS: CPT

## 2021-11-04 NOTE — PROGRESS NOTES
Kiran Pa  : 1960  Primary: Candance Hint Of Janes Montelongo*  Secondary:  [facility]rapy Center at [facility] [address] [facility]  [phone]   [phone]        OUTPATIENT PHYSICAL THERAPY: Daily Treatment Note 2021    ICD-10: Treatment Diagnosis: Low Back Pain M54.5, Muscle Weakness Generalized M62.81  Precautions/Allergies:   Hydrocodone  TREATMENT PLAN:  Effective Dates: 11/3/2021 TO 12/3/2021 (30 days). Frequency/Duration: 2 times a week for 30 Day(s)    Pre-treatment Symptoms/Complaints: Tolerated initial treatment well. He has ordered a foam roll   Pain: Initial: Pain Intensity 1: 6 /10 Post Session:  6/10   Medications Last Reviewed:  2021  Updated Objective Findings:  None Today  TREATMENT:   Therapeutic Exercise: (40 Minutes):  Exercises per grid below to improve mobility, strength and coordination. Required minimal visual, verbal, manual and tactile cues to promote proper body alignment, promote proper body posture and promote proper body mechanics. Progressed resistance, range, repetitions and complexity of movement as indicated. Date:  2021   Activity/Exercise Parameters   Miles Beams pose 2 min   Cat/Camel 2 min   Foam Roll x2 5 min   Posterior pelvic tilt with BP cuff for visual feedback 4 min   Bridge with Band 2x15   PPT with marching 2-3 min   PPT with SLR 2x15   Bird dog 3 min   Clams - band 2xfatigue B         Manual Therapy (    Soft Tissue Mobilization Duration  Duration: 15 Minutes): Manual techniques to facilitate improved motion and decreased pain.  (Used abbreviations: MET - muscle energy technique; PNF - proprioceptive neuromuscular facilitation; NMR - neuromuscular re-education; a/p - anterior to posterior; p/a - posterior to anterior)   · Thoracic spine mobilizations with patient prone - gr II/III  · Soft tissue mobilizations to lumbar paraspinals      [facility]  Treatment/Session Summary:    · Response to Treatment: Christiano tolerated exercises well today. He exhibits a decrease in stability when performing bird dogs on L LE compared to R. He fatigued quickly with clam shells, these were added to his HEP. BP biofeedback cuff used for core strengthening today which helped him gain a better understanding of how to properly perform exercises  · Communication/Consultation:  None today  · Equipment provided today:  None today  · Recommendations/Intent for next treatment session: Next visit will focus on strengthening, range of motion, and core stability.      Total Treatment Billable Duration:  55 of treatment  PT Patient Time In/Time Out  Time In: 1500  Time Out: 1600  Bobby Freeman    Future Appointments   Date Time Provider Dona Wood   11/10/2021  3:00 PM Evi HAYES   11/11/2021  3:00 PM Evi [facility]   11/15/2021  8:30 AM Evi [facility]   11/17/2021  8:30 AM Evi [facility]REED [facility]

## 2021-11-10 ENCOUNTER — HOSPITAL ENCOUNTER (OUTPATIENT)
Dept: PHYSICAL THERAPY | Age: 61
Discharge: HOME | End: 2021-11-10
Payer: COMMERCIAL

## 2021-11-10 NOTE — PROGRESS NOTES
Davi Oneill  : 1960  Primary: Kika Pemberton Of Janes Montelongo*  Secondary:  Therapy Center at [address]  [phone]   [phone]          DATE: 11/10/2021    Patient canceled his appointment today due to giving his spot to his wife. Will plan to follow up on next scheduled visit.       Jessica Bravo, PT, DPT, OCS

## 2021-11-11 ENCOUNTER — HOSPITAL ENCOUNTER (OUTPATIENT)
Dept: PHYSICAL THERAPY | Age: 61
Discharge: HOME | End: 2021-11-11
Payer: COMMERCIAL

## 2021-11-11 PROCEDURE — 97140 MANUAL THERAPY 1/> REGIONS: CPT

## 2021-11-11 PROCEDURE — 97110 THERAPEUTIC EXERCISES: CPT

## 2021-11-11 NOTE — PROGRESS NOTES
Geovanna Weinstein  : 1960  Primary: [facility]*  Secondary:  [facility] at [address]  [phone]   [phone]        OUTPATIENT PHYSICAL THERAPY: Daily Treatment Note 2021    ICD-10: Treatment Diagnosis: Low Back Pain M54.5, Muscle Weakness Generalized M62.81  Precautions/Allergies:   Hydrocodone  TREATMENT PLAN:  Effective Dates: 11/3/2021 TO 12/3/2021 (30 days). Frequency/Duration: 2 times a week for 30 Day(s)    Pre-treatment Symptoms/Complaints:  Went for a tough 1.45 hr hike this past weekend and it aggravated his back. He has been using his stand up desk and foam roller.  around L SI jt. When he volitionally activates core he reports a decrease in back pain. Pain: Initial: Pain Intensity 1: 5 /10 Post Session:  5/10   Medications Last Reviewed:  2021  Updated Objective Findings:  TTP along L SI jt and glute max  TREATMENT:   Therapeutic Exercise: (30 Minutes):  Exercises per grid below to improve mobility, strength and coordination. Required minimal visual, verbal, manual and tactile cues to promote proper body alignment, promote proper body posture and promote proper body mechanics. Progressed resistance, range, repetitions and complexity of movement as indicated. Date:  2021   Activity/Exercise Parameters   Zenovia Moat pose 2 min   Cat/Camel --   Foam Roll parallel and along L posterior hip 5 min   Posterior pelvic tilt with BP cuff for visual feedback --   Bridge with ER at top - band x30-40   PPT with marching 2 min   PPT with SLR --   Bird dog 3 min   Clams - band --   Crunch x10   SL bridge 2x10 B   Quadruped thoracic rotations x5 B     Manual Therapy (    Soft Tissue Mobilization Duration  Duration: 25 Minutes): Manual techniques to facilitate improved motion and decreased pain.  (Used abbreviations: MET - muscle energy technique; PNF - proprioceptive neuromuscular facilitation; NMR - neuromuscular re-education; a/p - anterior to posterior; p/a - posterior to anterior)   · Thoracic spine mobilizations with patient prone - gr II/III  · Soft tissue mobilizations to lumbar paraspinals after IASTM  · With written consent received and precautions reviewed, instrument-assisted soft tissue mobilization was performed to L SI jt and glute max with e-stim for the purpose of improving blood flow and soft tissue mobility with a positive treatment effect and no complications noted. [employer] Portal  Treatment/Session Summary:    · Response to Treatment: Christiano tolerated IASTM well today. He was advised that he may feel sore in his glutes for a day or two. Attempted crunches today but therapist noticed a diastasis recti (3 finger widths). Crunches were stopped and we will focus on that next week. This decrease in anterior core strength can be contributing to his low back pain. He exhibits improved stability on L LE during bird dogs compared to last visit. SL bridges were much easier on R LE compared to L  · Communication/Consultation:  None today  · Equipment provided today:  None today  · Recommendations/Intent for next treatment session: Next visit will focus on strengthening, range of motion, and core stability.      Total Treatment Billable Duration:  55 of treatment  PT Patient Time In/Time Out  Time In: 1500  Time Out: 1600  Enedelia Blizzard    Future Appointments   Date Time Provider Dona Wood   11/15/2021  8:30 AM Melven Hussar [facility]   11/17/2021  8:30 AM Melven Hussar [facility]   11/22/2021 11:30 AM Melven Hussar [facility]   12/1/2021  8:30 AM Melven Hussar [facility]   12/2/2021  1:00 PM Melven Hussar [facility]

## 2021-11-15 ENCOUNTER — HOSPITAL ENCOUNTER (OUTPATIENT)
Dept: PHYSICAL THERAPY | Age: 61
Discharge: HOME | End: 2021-11-15
Payer: COMMERCIAL

## 2021-11-15 PROCEDURE — 97110 THERAPEUTIC EXERCISES: CPT

## 2021-11-15 PROCEDURE — 97140 MANUAL THERAPY 1/> REGIONS: CPT

## 2021-11-15 NOTE — PROGRESS NOTES
Heather Cerna  : 1960  Primary: [facility]  Secondary:  [facility] at [address]  [phone]   [phone]        OUTPATIENT PHYSICAL THERAPY: Daily Treatment Note 11/15/2021    ICD-10: Treatment Diagnosis: Low Back Pain M54.5, Muscle Weakness Generalized M62.81  Precautions/Allergies:   Hydrocodone  TREATMENT PLAN:  Effective Dates: 11/3/2021 TO 12/3/2021 (30 days). Frequency/Duration: 2 times a week for 30 Day(s)    Pre-treatment Symptoms/Complaints:  Jazmin Roy reports he was sore for 2 days following IASTM but followed by 2 really good days with improved sleep. He still wonders if he needs to purchase a new bed. Pain: Initial: Pain Intensity 1: 5 /10 Post Session:  5/10   Medications Last Reviewed:  11/15/2021  Updated Objective Findings:  None Today  TREATMENT:   Therapeutic Exercise: (40 Minutes):  Exercises per grid below to improve mobility, strength and coordination. Required minimal visual, verbal, manual and tactile cues to promote proper body alignment, promote proper body posture and promote proper body mechanics. Progressed resistance, range, repetitions and complexity of movement as indicated. Date:  11/15/2021   Activity/Exercise Parameters   Bike 7 mins   Gandhi Sen[place] pose 2 min   Cat/Camel 2 min   Foam Roll x2 --   Posterior pelvic tilt with BP cuff for visual feedback 4 min   Bridge with Band 2x15, blue band   PPT with marching 2-3 min   PPT with SLR --   Bird dog 3 min   Clams - band --   Single leg bridges with band 20 reps each, holds for 10 sec 5 reps each   TA activation with diastasis recti self correction with sheet 4 mins   Lateral walks with mini squat  2 laps, green band   Hip extension at bar 10 reps each, green band             Manual Therapy (    Soft Tissue Mobilization Duration  Duration: 15 Minutes): Manual techniques to facilitate improved motion and decreased pain.  (Used abbreviations: MET - muscle energy technique; PNF - proprioceptive neuromuscular facilitation; NMR - neuromuscular re-education; a/p - anterior to posterior; p/a - posterior to anterior)   Thoracic spine mobilizations with patient prone - gr II/III  Soft tissue mobilizations to lumbar paraspinals        [facility]  Treatment/Session Summary:    Response to Treatment: Christiano tolerated exercises well today. He demonstrated decreased strength in his glutes L>R during leg extension based exercises. He fatigued quickly during lateral walking with mini squat and and leg extension standing at the bar. He reports that during he bridge exercise his pain decreases and everything feels \"evened out\". Communication/Consultation:  None today  Equipment provided today:  None today  Recommendations/Intent for next treatment session: Next visit will focus on strengthening, range of motion, and core stability.      Total Treatment Billable Duration:  55 of treatment  PT Patient Time In/Time Out  Time In: 0830  Time Out: 0930  Sheela Friends, SPT    Future Appointments   Date Time Provider Dona Wood   11/17/2021  8:30 AM Kirk HAYES   11/22/2021 11:30 AM Kirk HAYES   12/1/2021  8:30 AM Kirk HAYES   12/2/2021  1:00 PM Kirk HAYES

## 2021-11-17 ENCOUNTER — HOSPITAL ENCOUNTER (OUTPATIENT)
Dept: PHYSICAL THERAPY | Age: 61
Discharge: HOME | End: 2021-11-17
Payer: COMMERCIAL

## 2021-11-17 PROCEDURE — 97110 THERAPEUTIC EXERCISES: CPT

## 2021-11-17 PROCEDURE — 97140 MANUAL THERAPY 1/> REGIONS: CPT

## 2021-11-17 NOTE — PROGRESS NOTES
Gio Alejandro  : 1960  Primary: Dana Soliz [facility]  Secondary:  [facility] at [address]  [phone]   [phone]        OUTPATIENT PHYSICAL THERAPY: Daily Treatment Note 2021    ICD-10: Treatment Diagnosis: Low Back Pain M54.5, Muscle Weakness Generalized M62.81  Precautions/Allergies:   Hydrocodone  TREATMENT PLAN:  Effective Dates: 11/3/2021 TO 12/3/2021 (30 days). Frequency/Duration: 2 times a week for 30 Day(s)    Pre-treatment Symptoms/Complaints:  Stephanie Peralta reports he had increased soreness following last treatment session in his lower back but symptoms were relieved after taking a hot bath. He states he is going to play a round of golf after therapy today so needs to leave early   Pain: Initial: Pain Intensity 1: 10 Post Session:  5/10   Medications Last Reviewed:  2021  Updated Objective Findings:  None Today  TREATMENT:   Therapeutic Exercise: (25 Minutes):  Exercises per grid below to improve mobility, strength and coordination. Required minimal visual, verbal, manual and tactile cues to promote proper body alignment, promote proper body posture and promote proper body mechanics. Progressed resistance, range, repetitions and complexity of movement as indicated.    Date:  2021   Activity/Exercise Parameters   Bike 2 mins   Nadeem pose -   Cat/Camel 1 min   Foam Roll x2 --   Posterior pelvic tilt with alternating arm extensions 20 reps   Bridge with Band 2x15 red   PPT with marching 20 reps (with pull down red band)   PPT with SLR 20 reps (with pull down red band)   Bird dog 20 reps   Clams - band --   Bridges with single leg holds 10 reps, 10 sec holds   TA activation with diastasis recti self correction with sheet --   Lateral walks with mini squat  --   Prone hip extension 2x20 B   Quadriped thoracic rotation 20 reps          Manual Therapy (    Soft Tissue Mobilization Duration  Duration: 10 Minutes): Manual techniques to facilitate improved motion and decreased pain. (Used abbreviations: MET - muscle energy technique; PNF - proprioceptive neuromuscular facilitation; NMR - neuromuscular re-education; a/p - anterior to posterior; p/a - posterior to anterior)   · Thoracic spine mobilizations with patient prone - gr II/III  · Soft tissue mobilizations to lumbar paraspinals -- not performed         [facility]  Treatment/Session Summary:    · Response to Treatment: Christiano tolerated exercises today but still lacks muscular endurance. He is progressing in his strengthening but still demonstrates decreased glute activation and core stability during exercise. Decreased mobility still present in thoracic spine. · Communication/Consultation:  None today  · Equipment provided today:  None today  · Recommendations/Intent for next treatment session: Next visit will focus on strengthening, range of motion, and core stability.      Total Treatment Billable Duration:  40 of treatment  PT Patient Time In/Time Out  Time In: 0830  Time Out: 0910  FRANCIS Stock    Future Appointments   Date Time Provider Dona Wood   11/22/2021 11:30 AM Alexye Im [facility]   12/1/2021  8:30 AM Gale Im [facility]   12/2/2021  1:00 PM Gale Im [facility]

## 2021-11-22 ENCOUNTER — HOSPITAL ENCOUNTER (OUTPATIENT)
Dept: PHYSICAL THERAPY | Age: 61
Discharge: HOME | End: 2021-11-22
Payer: COMMERCIAL

## 2021-11-22 PROCEDURE — 97140 MANUAL THERAPY 1/> REGIONS: CPT

## 2021-11-22 PROCEDURE — 97110 THERAPEUTIC EXERCISES: CPT

## 2021-11-22 NOTE — PROGRESS NOTES
Luisito Khalil  : 1960  Primary: Lindaann Lundborg [facility]  Secondary:  [facility] at [place] [address] [address], [address]  [phone]   [phone]        OUTPATIENT PHYSICAL THERAPY: Daily Treatment Note 2021    ICD-10: Treatment Diagnosis: Low Back Pain M54.5, Muscle Weakness Generalized M62.81  Precautions/Allergies:   Hydrocodone  TREATMENT PLAN:  Effective Dates: 11/3/2021 TO 12/3/2021 (30 days). Frequency/Duration: 2 times a week for 30 Day(s)    Pre-treatment Symptoms/Complaints:  Faby Anderson reports he has been feeling better and has been sleeping completely through the night without any pain. He had no increased pain after traveling over the weekend. Pain: Initial: Pain Intensity 1: 3 /10 Post Session:  3/10   Medications Last Reviewed:  2021  Updated Objective Findings:  None Today  TREATMENT:   Therapeutic Exercise: (35 Minutes):  Exercises per grid below to improve mobility, strength and coordination. Required minimal visual, verbal, manual and tactile cues to promote proper body alignment, promote proper body posture and promote proper body mechanics. Progressed resistance, range, repetitions and complexity of movement as indicated.    Date:  2021   Activity/Exercise Parameters   Bike --   Nalini Beverage pose 2 mins   Cat/Camel 1 min   Foam Roll x2 5 min   Posterior pelvic tilt with alternating arm extensions --   Bridge with Band --   PPT with marching 20 reps (with bracing for diastasis recti)   PPT with SLR --   Bird dog 20 reps   Clams - band --   Bridges with single leg holds --   TA activation with diastasis recti self correction with sheet --   Lateral walks with mini squat  --   Prone hip extension --   Quadruped thoracic rotation 20 reps    Hip abduction and extension 20 reps each (green band)   Paloff Press 15 reps each side (red)   Walk outs (forward, backwards, lateral) 15 reps each (double red)   Crunch with bracing for diastasis recti 2xfatigue     Manual Therapy (    Soft Tissue Mobilization Duration  Duration: 10 Minutes): Manual techniques to facilitate improved motion and decreased pain. (Used abbreviations: MET - muscle energy technique; PNF - proprioceptive neuromuscular facilitation; NMR - neuromuscular re-education; a/p - anterior to posterior; p/a - posterior to anterior)   Thoracic spine mobilizations with patient prone - gr II/III  Soft tissue mobilizations to lumbar paraspinals -- not performed         [facility] Portal  Treatment/Session Summary:    Response to Treatment: Christiano tolerated exercises today but still has decreased mobility in his thoracic spine. He is taking progress in bracing during abdominal exercises for his diastasis recti. He is making progress with his glute strengthening. Communication/Consultation:  None today  Equipment provided today:  None today  Recommendations/Intent for next treatment session: Next visit will focus on strengthening, range of motion, and core stability.      Total Treatment Billable Duration:  45 of treatment  PT Patient Time In/Time Out  Time In: 1140  Time Out: 1230  Rona Lindo, [facility]    Future Appointments   Date Time Provider Dona Wood   12/1/2021  8:30 AM Deonna HAYES   12/2/2021  1:00 [facility]

## 2021-11-29 ENCOUNTER — APPOINTMENT (OUTPATIENT)
Dept: PHYSICAL THERAPY | Age: 61
End: 2021-11-29
Payer: COMMERCIAL

## 2021-12-01 ENCOUNTER — HOSPITAL ENCOUNTER (OUTPATIENT)
Dept: PHYSICAL THERAPY | Age: 61
Discharge: HOME | End: 2021-12-01
Payer: COMMERCIAL

## 2021-12-01 PROCEDURE — 97140 MANUAL THERAPY 1/> REGIONS: CPT

## 2021-12-01 PROCEDURE — 97110 THERAPEUTIC EXERCISES: CPT

## 2021-12-01 NOTE — PROGRESS NOTES
Aiden Walker  : 1960  Primary: [facility]*  Secondary:  [facility] Center at [facility] [address], [address] [facility]  [phone]   [phone]        OUTPATIENT PHYSICAL THERAPY: Daily Treatment Note 2021    ICD-10: Treatment Diagnosis: Low Back Pain M54.5, Muscle Weakness Generalized M62.81  Precautions/Allergies:   Hydrocodone  TREATMENT PLAN:  Effective Dates: 11/3/2021 TO 12/3/2021 (30 days). Frequency/Duration: 2 times a week for 30 Day(s)    Pre-treatment Symptoms/Complaints:  Atul Sun reports he is doing well. Didn't sleep well last night but not because of the back. Pain: Initial: Pain Intensity 1: 1 /10 Post Session:  1/10   Medications Last Reviewed:  2021  Updated Objective Findings:  None Today  TREATMENT:   Therapeutic Exercise: (40 Minutes):  Exercises per grid below to improve mobility, strength and coordination. Required minimal visual, verbal, manual and tactile cues to promote proper body alignment, promote proper body posture and promote proper body mechanics. Progressed resistance, range, repetitions and complexity of movement as indicated.    Date:  2021   Activity/Exercise Parameters   Bike 5 min   Nadeem pose 2 mins   Cat/Camel 1 min   Foam Roll parallel 2 min   Posterior pelvic tilt with alternating arm extensions --   Bridge with Band and 15# 2x20   PPT with marching 20 reps (with bracing for diastasis recti)   PPT with SLR 3x10 B   Bird dog - perturbations from PT x10   Clams - band --   SL bridge 2x10 B   TA activation with diastasis recti self correction with sheet --   Lateral walks with mini squat - band  3 min   Prone hip extension 3x10 B   Quadruped thoracic rotation x5 B    Standing calf stretch 3x30 sec   Paloff Press --   Walk outs (forward, backwards, lateral) --   Deadlift - 22# to box 2x15     Manual Therapy (    Soft Tissue Mobilization Duration  Duration: 15 Minutes): Manual techniques to facilitate improved motion and decreased pain. (Used abbreviations: MET - muscle energy technique; PNF - proprioceptive neuromuscular facilitation; NMR - neuromuscular re-education; a/p - anterior to posterior; p/a - posterior to anterior)   · Thoracic spine mobilizations with patient prone - gr II/III  · Soft tissue mobilizations to lumbar paraspinals       [facility] Portal  Treatment/Session Summary:    · Response to Treatment: Dieter Epps did well with treatment today with no increase in back pain. He exhibits improved core activation with supine exercises. Needed multiple tactile and verbal cues during deadlifts as he wants to squat more than activate posterior chain  · Communication/Consultation:  None today  · Equipment provided today:  None today  · Recommendations/Intent for next treatment session: Next visit will focus on strengthening, range of motion, and core stability.      Total Treatment Billable Duration:  55 of treatment  PT Patient Time In/Time Out  Time In: 0830  Time Out: 0930  Pennye Ha    Future Appointments   Date Time Provider Dona Wood   12/2/2021  1:00 PM Keyona [facility]

## 2021-12-02 ENCOUNTER — HOSPITAL ENCOUNTER (OUTPATIENT)
Dept: PHYSICAL THERAPY | Age: 61
Discharge: HOME | End: 2021-12-02
Payer: COMMERCIAL

## 2021-12-02 PROCEDURE — 97140 MANUAL THERAPY 1/> REGIONS: CPT

## 2021-12-02 PROCEDURE — 97110 THERAPEUTIC EXERCISES: CPT

## 2021-12-02 NOTE — PROGRESS NOTES
Polly Pettit  : 1960  Primary: [facility]*  Secondary:  [facility]rapy [facility] at [facility], [address] [facility]  [phone]   [phone]        OUTPATIENT PHYSICAL THERAPY: Daily Treatment Note 2021    ICD-10: Treatment Diagnosis: Low Back Pain M54.5, Muscle Weakness Generalized M62.81  Precautions/Allergies:   Hydrocodone  TREATMENT PLAN:  Effective Dates: 2021 TO 2022 (30 days). Frequency/Duration: 1 time a week for 30 Day(s)    Pre-treatment Symptoms/Complaints:  David Canela reports he is doing well overall. Pain: Initial: Pain Intensity 1: 1 /10 Post Session:  1/10   Medications Last Reviewed:  2021  Updated Objective Findings:  See Recert from today for full details  TREATMENT:   Therapeutic Exercise: (40 Minutes):  Exercises per grid below to improve mobility, strength and coordination. Required minimal visual, verbal, manual and tactile cues to promote proper body alignment, promote proper body posture and promote proper body mechanics. Progressed resistance, range, repetitions and complexity of movement as indicated. Date:  2021   Activity/Exercise Parameters   Bike 5 min   Jocelyne Javier pose 2 mins   Cat/Camel 1 min   Foam Roll parallel --   Posterior pelvic tilt with alternating arm extensions --   Bridge with Band and 15# 2x20   PPT with marching --   PPT with SLR --   Bird dog - band 2x10   Clams - band --   SL bridge 2x10 B   Plank 3x30 sec   Lateral walks with mini squat - band  --   Prone hip extension --   Quadruped thoracic rotation --   SL balance with contralateral hip flexion - band 2x10 B   Paloff Press 5 min   Goblet squat - 15# 2x20   Deadlift - 22# to box --     Manual Therapy (    Soft Tissue Mobilization Duration  Duration: 15 Minutes): Manual techniques to facilitate improved motion and decreased pain.  (Used abbreviations: MET - muscle energy technique; PNF - proprioceptive neuromuscular facilitation; NMR - neuromuscular re-education; a/p - anterior to posterior; p/a - posterior to anterior)   · Thoracic spine mobilizations with patient prone - gr II/III  · Soft tissue mobilizations to lumbar paraspinals       [facility] Portal  Treatment/Session Summary:    · Response to Treatment: Christiano tolerated treatment well today. He was challenged with resisted bird dogs but able to maintain good core stability. He was also challenged with planks  · Communication/Consultation:  None today  · Equipment provided today:  None today  · Recommendations/Intent for next treatment session: Next visit will focus on strengthening, range of motion, and core stability. Total Treatment Billable Duration:  55 of treatment  PT Patient Time In/Time Out  Time In: 1300  Time Out: 33 [facility] Anish    No future appointments.
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
## 2021-12-08 ENCOUNTER — APPOINTMENT (OUTPATIENT)
Dept: PHYSICAL THERAPY | Age: 61
End: 2021-12-08
Payer: COMMERCIAL

## 2021-12-15 ENCOUNTER — HOSPITAL ENCOUNTER (OUTPATIENT)
Dept: PHYSICAL THERAPY | Age: 61
Discharge: HOME | End: 2021-12-15
Payer: COMMERCIAL

## 2021-12-15 PROCEDURE — 97110 THERAPEUTIC EXERCISES: CPT

## 2021-12-15 PROCEDURE — 97140 MANUAL THERAPY 1/> REGIONS: CPT

## 2021-12-15 NOTE — PROGRESS NOTES
Fannyfabi Ballard  : 1960  Primary: Onetha Hiss Of [facility]*  Secondary:  [facility] at [address]  [phone]   [phone]        OUTPATIENT PHYSICAL THERAPY: Daily Treatment Note 12/15/2021    ICD-10: Treatment Diagnosis: Low Back Pain M54.5, Muscle Weakness Generalized M62.81  Precautions/Allergies:   Hydrocodone  TREATMENT PLAN:  Effective Dates: 2021 TO 2022 (30 days). Frequency/Duration: 1 time a week for 30 Day(s)    Pre-treatment Symptoms/Complaints:  Robert Brantley reports he is doing well. He warmed up on the foam roller before golfing yesterday and it really helped. He is sleeping much better and sitting tolerance has improved at work  Pain: Initial: Pain Intensity 1: 0 /10 Post Session:  0/10   Medications Last Reviewed:  12/15/2021  Updated Objective Findings:  See Discharge from today for full details  TREATMENT:   Therapeutic Exercise: (40 Minutes):  Exercises per grid below to improve mobility, strength and coordination. Required minimal visual, verbal, manual and tactile cues to promote proper body alignment, promote proper body posture and promote proper body mechanics. Progressed resistance, range, repetitions and complexity of movement as indicated.    Date:  12/15/2021   Activity/Exercise Parameters   Bike 5 min   Nadeem pose 2 mins   Cat/Camel 1 min   Foam Roll parallel --   Posterior pelvic tilt with alternating arm extensions --   Bridge with Band and 15# 2x20   PPT with marching --   PPT with SLR --   Bird dog - band 2x10   Clams - band xfatigue B   SL bridge 2x10 B   Plank --   Lateral walks with mini squat - band  3 laps   Prone hip extension --   Quadruped thoracic rotation --   SL balance chops/lifts 2x10 B   Paloff Press 5 min   Goblet squat - 15# --   Deadlift - 22# to box 2x15     Manual Therapy (    Soft Tissue Mobilization Duration  Duration: 15 Minutes): Manual techniques to facilitate improved motion and decreased pain. (Used abbreviations: MET - muscle energy technique; PNF - proprioceptive neuromuscular facilitation; NMR - neuromuscular re-education; a/p - anterior to posterior; p/a - posterior to anterior)   · Thoracic spine mobilizations with patient prone - gr II/III  · Soft tissue mobilizations to lumbar paraspinals       [facility] Portal  Treatment/Session Summary:    · Response to Treatment: Mounika Rosas exhibits improved hip and core strength and stability with bird dog. Improved mechanics with deadlifts. He has progressed well through therapy. Therapist revised his HEP and advised him to reach out with questions/concerns. · Communication/Consultation:  None today  · Equipment provided today:  Revised HEP  · Recommendations: discharge to home program with no further plans for follow up    Total Treatment Billable Duration:  55 of treatment  PT Patient Time In/Time Out  Time In: 0930  Time Out: 1030  Tk Oconnell    No future appointments.
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
